# Patient Record
Sex: FEMALE | Race: BLACK OR AFRICAN AMERICAN | Employment: FULL TIME | URBAN - METROPOLITAN AREA
[De-identification: names, ages, dates, MRNs, and addresses within clinical notes are randomized per-mention and may not be internally consistent; named-entity substitution may affect disease eponyms.]

---

## 2017-08-22 ENCOUNTER — HOSPITAL ENCOUNTER (OUTPATIENT)
Dept: PHYSICAL THERAPY | Age: 47
Discharge: HOME | End: 2017-08-22
Payer: COMMERCIAL

## 2017-08-22 PROCEDURE — 97535 SELF CARE MNGMENT TRAINING: CPT

## 2017-08-22 PROCEDURE — 97162 PT EVAL MOD COMPLEX 30 MIN: CPT

## 2017-08-22 PROCEDURE — 97110 THERAPEUTIC EXERCISES: CPT

## 2017-08-22 NOTE — PROGRESS NOTES
PT DAILY TREATMENT NOTE/LUMBAR EVAL 3-16    Patient Name: Helder Hughes  Date:2017  : 1970  [x]  Patient  Verified  Payor: [employer] / Plan: Jazzy Freeman [id number] PPO / Product Type: PPO /    In time:9:40am  Out time:10:35am  Total Treatment Time (min): 55  Total Timed Codes (min): 25  1:1 Treatment Time ( only): 55   Visit #: 1 of 8    Treatment Area: Low back pain [M54.5]  Leg length discrepancy [M21.70]  SUBJECTIVE  Pain Level (0-10 scale): 6-7  [x]constant []intermittent []improving []worsening []no change since onset variable    Any medication changes, allergies to medications, adverse drug reactions, diagnosis change, or new procedure performed?: [x] No    [] Yes (see summary sheet for update)  Subjective functional status/changes:     Pt reports joint pain including back and hip pain for > 20 years, but reports it worsened 6-9 months ago without mechanism. She reports she has been screened for RA and lupus with no (+) results as of yet. X-rays unremarkable of ankles, knees, hips, back, shoulders per pt report with reports of \"arthritis\". Reports back pain increases with all activity including positioning in standing, sitting, lying for > 5-10 minutes and tends to be skewed to R side with referral into the R post thigh. Limited ease of negotiating stairs.     PLOF: limited activity tolerance intermittently for years d/t pain  Limitations to PLOF: worsening positional tolerance  Mechanism of Injury: chronic  Current symptoms/Complaints: hand pain and swelling/stiffness, L ankle swelling, LBP and R hip pain and aching into R post mid thigh, numbness in B hands/fingers starting a few months ago, reports leg length discrepancy, \"burning\" sensation in lateral leg occasionally  Previous Treatment/Compliance: no PT per pt report for this issue specifically, pain medication, otherwise none per pt report  PMHx/Surgical hx: unremarkable  Work Hx: teacher  Living Situation:   Pt Goals: \"Be able to sleep without pain and control pain without it being debilitating. \"  Barriers: []pain []financial []time []transportation []other  Motivation:   Substance use: []Alcohol []Tobacco []other:   FABQ Score: []low []elevate  Cognition: A & O x 4    Other:    OBJECTIVE/EXAMINATION    30 min [x]Eval                  []Re-Eval       16 min Therapeutic Exercise:  [] See flow sheet : HEP creation and instruction per handout   Rationale: increase ROM and increase strength to improve the patients ability to improve ease of ADLs. Self Care: 9 minutes pt education regarding relevant anatomy, diagnosis, prognosis, plan of care, education regarding proper footwear for midfoot arch support to facilitate pt compliance and self management. With   [] TE   [] TA   [] neuro   [] other: Patient Education: [x] Review HEP    [] Progressed/Changed HEP based on:   [] positioning   [] body mechanics   [] transfers   [] heat/ice application    [] other:      Other Objective/Functional Measures:     Physical Therapy Evaluation - Lumbar Spine ([facility])    SUBJECTIVE  Chief Complaint: back pain with referral into R buttock    Mechanism of injury: chronic    Symptoms:  Aggravated by:   [x] Bending [x] Sitting [x] Standing [x] Walking   [] Moving [] Cough [] Sneeze [] Valsalva   [] AM  [] PM  Lying:  [x] sup   [x] pro   [] sidelying   [] Other:     Eased by:    [] Bending [] Sitting [] Standing [] Walking   [] Moving [] AM  [] PM  Lying: [] sup  [] pro  [] sidelying   [] Other:     General Health:  Red Flags Indicated? [] Yes    [x] No  [] Yes [] No Recent weight change (If yes, due to dieting?  [] Yes  [] No)   [] Yes [] No Weakness in legs during walking  [] Yes [] No Unremitting pain at night  [] Yes [] No Abdominal pain or problems  [] Yes [] No Rectal bleeding  [] Yes [] No Feet more cold or painful in cold weather  [] Yes [] No Menstrual irregularities  [] Yes [] No Blood or pain with urination  [] Yes [] No Dysfunction of bowel or bladder  [] Yes [] No Recent illness within past 3 weeks (i.e, cold, flu)  [] Yes [] No Numbness/tingling in buttock/genitalia region    Past History/Treatments:     Diagnostic Tests: [] Lab work [x] X-rays    [] CT [] MRI     [] Other:  Results: \"arthritis\" per pt report, results not available in system    Functional Status  Prior level of function:  Present functional limitations:  What position do you sleep in?:    OBJECTIVE  Posture:  Lateral Shift: [] R    [] L     [] +  [x] -  Kyphosis: [] Increased [] Decreased   [x]  WNL  Lordosis:  [x] Increased [] Decreased   [] WNL  Pelvic symmetry: [x] WNL    [] Other:    Gait:  [x] Normal     [] Abnormal:    Active Movements: [] N/A   [] Too acute   [] Other:  ROM % AROM % PROM Comments:pain, area   Forward flexion 40-60 Fingers 7 inches from floor  B LBP and tightness into superior buttock   Extension 20-30 75%  Increased B \"sharp\" LBP   SB right 20-30 50% 100% L \"pulling\" LBP   SB left 20-30 50% 100% R \"pulling\" LBP   Rotation right 5-10 50% 100% \"pulling\" LBP with end range PROM and overpressure   Rotation left 5-10 50% 100% Pulling LBP with end range PROM and overpressure     Repeated Movements   Effects on present pain: produces (DE), abolishes (A), increases (incr), decreases (decr), centralizes (C), peripheral (PH), no effect (NE)   Pre-Test Sx Flexion Repeated Flexion Extension Repeated Extension Repeated SBL Repeated SBR   Sitting          Standing  inc inc inc inc     Lying  NE NE inc inc N/A N/A   Comments:  Side Glide:  Sustained passive positioning test:    Neuro Screen [x] WNL  Myotome/Dermatome/Reflexes:  Comments:    Dural Mobility:  SLR Sitting: [] R    [] L    [] +    [] -  @ (degrees):           Supine: [] R    [] L    [] +    [x] -  @ (degrees):   Slump Test: [] R    [] L    [] +    [x] -  @ (degrees):   Prone Knee Bend: [] R    [] L    [] +    [x] -      Palpation  [x] Min  [] Mod  [] Severe    Location: generalized TTP throughout B gluteals  [x] Min  [] Mod  [] Severe    Location: pain provocation R > L reported with sacral  and with palpation of deep lumbar paraspinals  [] Min  [] Mod  [] Severe    Location:    Strength   L(0-5) R (0-5) N/T   Hip Flexion (L1,2) 5 5 []   Knee Extension (L3,4) 5 5 []   Ankle Dorsiflexion (L4) 5 5 []   Great Toe Extension (L5) 5 5 []   Ankle Plantarflexion (S1) 5 5 []   Knee Flexion (S1,2) 5 5 []   Upper Abdominals   []   Lower Abdominals   []   Paraspinals   []   Back Rotators   []   Gluteus Josué 4 4 []   Other glute med 4 4 []   Hip ER/IR: 5/5 bilat    Special Tests  Lumbar:  Lumb.  Compression: [] Pos  [x] Neg               Lumbar Distraction:   [] Pos  [x] Neg    Quadrant:  [x] Pos  [] Neg   [] Flex  [x] Ext    Sacroilliac:  Gaenslen's: [] R    [] L    [] +    [x] -     Compression: [] +    [x] -     Gapping:  [] +    [x] -     Thigh Thrust: [] R    [] L    [] +    [] -     Leg Length: [] +    [x] -   Position: supine (-) , questionable R apparent leg length discrepancy in standing, but not clearly present    Crests: level    ASIS: level    PSIS:    Sacral Sulcus:    Mobility: Standing flex:     Sitting flex:     Supine to sit:     Prone knee bend:         Hip: Lukas Chol:  [x] R    [x] L    [x] +    [] - L> R mild limited hip mobility, B LBP provocation with R, R LBP provocation with L YONG and L hip discomfort    Scour:  [] R    [] L    [] +    [x] -     Piriformis: [] R    [] L    [] +    [x] -          Deficits: Manas's: [] R    [] L    [] +    [x] -     Miranda : [x] R    [x] L    [x] +    [] -     Hamstrings 90/90: (-) bilat    Gastrocsoleus (to neutral): Right: Left:    (+) Ely's bilat  Other tests/comments:  Actual leg length: R 87.5 cm, L 87.0 cm  No clear apparent leg length discrepancy    Poor lumbar trunk flexion mobility with limited reversal of lumbar lordosis to near neutral with active trunk flexion    Grossly WNL bilat hip ER/IR mobility in prone void of pain provocation    Reports relief with standing and supine PPT, but difficulty actively facilitating movement     SKC: WNL bilat  DKC: WNL  Bridge: B LBP reported and \"pulling tightness\"    Pain Level (0-10 scale) post treatment: 7    ASSESSMENT/Changes in Function: Per POC    Patient will continue to benefit from skilled PT services to modify and progress therapeutic interventions, address functional mobility deficits, address ROM deficits, address strength deficits, analyze and address soft tissue restrictions, analyze and cue movement patterns, analyze and modify body mechanics/ergonomics, assess and modify postural abnormalities and instruct in home and community integration to attain remaining goals. [x]  See Plan of Care  []  See progress note/recertification  []  See Discharge Summary         Progress towards goals / Updated goals:  Per POC. PLAN  [x]  Upgrade activities as tolerated     [x]  Continue plan of care  []  Update interventions per flow sheet       []  Discharge due to:_  [x]  Other:_Address limited ant hip mobility, lumbar extensor limited mobility, and reduce hyperlordotic postural tendencies.  Check pelvic alignment and leg length PRN, however, does not appear grossly abnormal during eval.      Hannah Martinez, PT, DPT, ATC, CSCS 8/22/2017  9:45 AM

## 2017-08-22 NOTE — PROGRESS NOTES
[facility]  [address]  [place], [address]  [phone] fax    Plan of Care/ Statement of Necessity for Physical Therapy Services    Patient name: Doreatha Hashimoto Start of Care: 2017   Referral source: Rigoberto Melchor MD : 1970    Medical Diagnosis: Low back pain [M54.5]   Onset Date:20+ years, exacerbation over last 6-9 months    Treatment Diagnosis: Mechanical LBP   Prior Hospitalization: see medical history Provider#: [id number]   Medications: Verified on Patient summary List    Comorbidities: \"arthritis\", otherwise unremarkable per pt report   Prior Level of Function: limited activity tolerance intermittently for years d/t pain     The Plan of Care and following information is based on the information from the initial evaluation. Assessment/ key information: Pt is a 55year old female who reports reports joint pain including back and hip pain for > 20 years, but reports it worsened 6-9 months ago without reported mechanism. She reports she has been screened for RA and lupus with no (+) results as of yet. X-rays unremarkable of ankles, knees, hips, back, shoulders per pt report with reports of \"arthritis\". Reports back pain increases with all activity including positioning in standing, sitting, lying for > 5-10 minutes and tends to be skewed to R side with referral into the R post thigh. Limited ease of negotiating stairs. She demonstrates signs and symptoms of mechanical LBP with possible SIJ involvement as evidenced by pain provocation and limited trunk mobility particularly with trunk extension > flexion, hyperlordotic lumbar posture, TTP B L/S paraspinals and pain with SIJ CPA assessment, TTP B gluteals, hypertonic lumbar paraspinals and limited ant hip extension mobility bilat, and (+) YONG L > R with some reproduction of back pain. At this time, pt demonstrate no significant pelvic malignment. obliquity and WNL true leg length. Pt will benefit from skilled PT to address her impairments and improve her level of function. Evaluation Complexity History MEDIUM  Complexity : 1-2 comorbidities / personal factors will impact the outcome/ POC ; Examination MEDIUM Complexity : 3 Standardized tests and measures addressing body structure, function, activity limitation and / or participation in recreation  ;Presentation MEDIUM Complexity : Evolving with changing characteristics  ; Clinical Decision Making MEDIUM Complexity : FOTO score of 26-74  Overall Complexity Rating: MEDIUM  Problem List: pain affecting function, decrease ROM, decrease ADL/ functional abilitiies, decrease activity tolerance and decrease flexibility/ joint mobility   Treatment Plan may include any combination of the following: Therapeutic exercise, Therapeutic activities, Neuromuscular re-education, Physical agent/modality, Manual therapy, Patient education and Self Care training  Patient / Family readiness to learn indicated by: asking questions and interest  Persons(s) to be included in education: patient (P)  Barriers to Learning/Limitations: Chronic duration of pain  Patient Goal (s): less pain during daily activities and to sleep with less pain.   Patient Self Reported Health Status: good  Rehabilitation Potential: fair    Short Term Goals: To be accomplished in 2 weeks:   1. Patient will report performance of HEP at least 2 times per day to facilitate improved outcomes and improved self management. 2. Pt will demonstrate ability to perform posterior pelvic tilt with reversal of lordotic lumbar curvature to reduce LBP and facilitate self management. Long Term Goals: To be accomplished in 4 weeks:   1. Patient will report FOTO score of 61 or better to indicate significant improvement in functional status. 2. Pt will demonstrate (-) wilda test bilat to reduce trunk extensor stress on her lumbar spine and improve ease of daily activity.    3. Pt will demonstrate trunk flexion fingers to toes to improve trunk mobility and ease of stooping activity. 4. Pt will report ability to sleep 4 hours during the night undisturbed by pain to improve QOL. Frequency / Duration: Patient to be seen 2 times per week for 4 weeks. Patient/ Caregiver education and instruction: Diagnosis, prognosis, self care, activity modification and exercises   [x]  Plan of care has been reviewed with PTA    Ellie Manrique, PT, DPT, ATC, CSCS 8/22/2017 2:18 PM    ________________________________________________________________________    I certify that the above Therapy Services are being furnished while the patient is under my care. I agree with the treatment plan and certify that this therapy is necessary.     [de-identified] Signature:____________________  Date:____________Time: _________    Please sign and return to [facility]  [address]  [place], [address]  [phone] fax

## 2017-08-25 ENCOUNTER — HOSPITAL ENCOUNTER (OUTPATIENT)
Dept: PHYSICAL THERAPY | Age: 47
Discharge: HOME | End: 2017-08-25
Payer: COMMERCIAL

## 2017-08-25 PROCEDURE — 97110 THERAPEUTIC EXERCISES: CPT

## 2017-08-25 PROCEDURE — 97112 NEUROMUSCULAR REEDUCATION: CPT

## 2017-08-27 PROCEDURE — 97112 NEUROMUSCULAR REEDUCATION: CPT

## 2017-08-27 PROCEDURE — 97110 THERAPEUTIC EXERCISES: CPT

## 2017-08-27 NOTE — PROGRESS NOTES
PT DAILY TREATMENT NOTE     Patient Name: Monica Don  Date:2017  : 1970  [x]  Patient  Verified  Payor: BLUE CROSS / Plan: Jazzy Freeman [id number] PPO / Product Type: PPO /    In time:5:31  Out time:6:22  Total Treatment Time (min): 41  Visit #: 2 of 8    Treatment Area: Low back pain [M54.5]    SUBJECTIVE  Pain Level (0-10 scale): 6  Any medication changes, allergies to medications, adverse drug reactions, diagnosis change, or new procedure performed?: [x] No    [] Yes (see summary sheet for update)  Subjective functional status/changes:   [] No changes reported  Pt reports some pain with hip flexor stretch off the bed at home.      OBJECTIVE    Modality rationale:    Min Type Additional Details    [] Estim:  []Unatt       []IFC  []Premod                        []Other:  []w/ice   []w/heat  Position:  Location:    [] Estim: []Att    []TENS instruct  []NMES                    []Other:  []w/US   []w/ice   []w/heat  Position:  Location:    []  Traction: [] Cervical       []Lumbar                       [] Prone          []Supine                       []Intermittent   []Continuous Lbs:  [] before manual  [] after manual    []  Ultrasound: []Continuous   [] Pulsed                           []1MHz   []3MHz W/cm2:  Location:    []  Iontophoresis with dexamethasone         Location: [] Take home patch   [] In clinic    []  Ice     []  heat  []  Ice massage  []  Laser   []  Anodyne Position:  Location:    []  Laser with stim  []  Other:  Position:  Location:    []  Vasopneumatic Device Pressure:       [] lo [] med [] hi   Temperature: [] lo [] med [] hi   [] Skin assessment post-treatment:  []intact []redness- no adverse reaction    []redness - adverse reaction:       31 min Therapeutic Exercise:  [x] See flow sheet :   Rationale: increase ROM and increase strength to improve the patients ability to perform ADL    10 min Neuromuscular Re-education:  []  See flow sheet :   Rationale: increase strength, improve coordination and increase proprioception  to improve the patients ability to perform functional tasks              With   [] TE   [] TA   [] neuro   [] other: Patient Education: [x] Review HEP    [] Progressed/Changed HEP based on:   [] positioning   [] body mechanics   [] transfers   [] heat/ice application    [] other:      Other Objective/Functional Measures: initiated therex per flow sheet     Pain Level (0-10 scale) post treatment: 6    ASSESSMENT/Changes in Function: pt challenged with therex but seemed to tolerate well. Adjusted supine Amilcar stretch off the end of the bed vs side for pt comfort. Patient will continue to benefit from skilled PT services to modify and progress therapeutic interventions, address functional mobility deficits, address ROM deficits, address strength deficits, analyze and address soft tissue restrictions, analyze and cue movement patterns and assess and modify postural abnormalities to attain remaining goals. []  See Plan of Care  []  See progress note/recertification  []  See Discharge Summary         Progress towards goals / Updated goals:  Short Term Goals: To be accomplished in 2 weeks:                         1. Patient will report performance of HEP at least 2 times per day to facilitate improved outcomes and improved self management. -progressing 8-25-17                         2. Pt will demonstrate ability to perform posterior pelvic tilt with reversal of lordotic lumbar curvature to reduce LBP and facilitate self management. Long Term Goals: To be accomplished in 4 weeks:                         1. Patient will report FOTO score of 61 or better to indicate significant improvement in functional status. 2. Pt will demonstrate (-) amilcar test bilat to reduce trunk extensor stress on her lumbar spine and improve ease of daily activity.                          3. Pt will demonstrate trunk flexion fingers to toes to improve trunk mobility and ease of stooping activity. 4. Pt will report ability to sleep 4 hours during the night undisturbed by pain to improve QOL.     PLAN  []  Upgrade activities as tolerated     [x]  Continue plan of care  []  Update interventions per flow sheet       []  Discharge due to:_  []  Other:_      Tasneem Branham, PT 8/27/2017  12:03 PM    Future Appointments  Date Time Provider Kim Alexandre   8/29/2017 6:00 PM [address] [place] [address]   9/1/2017 6:00 PM Tasneem Branham, PT [facility]

## 2017-08-29 ENCOUNTER — HOSPITAL ENCOUNTER (OUTPATIENT)
Dept: PHYSICAL THERAPY | Age: 47
Discharge: HOME | End: 2017-08-29
Payer: COMMERCIAL

## 2017-08-29 PROCEDURE — 97110 THERAPEUTIC EXERCISES: CPT

## 2017-08-29 PROCEDURE — 97112 NEUROMUSCULAR REEDUCATION: CPT

## 2017-08-29 NOTE — PROGRESS NOTES
PT DAILY TREATMENT NOTE     Patient Name: Cheryl Weeks  Date:2017  : 1970  [x]  Patient  Verified  Payor: BLUE CROSS / Plan: [facility] PPO / Product Type: PPO /    In time:6:00pm  Out time:6:48pm  Total Treatment Time (min): 48  Visit #: 3 of 8    Treatment Area: Low back pain [M54.5]    SUBJECTIVE  Pain Level (0-10 scale): 7  Any medication changes, allergies to medications, adverse drug reactions, diagnosis change, or new procedure performed?: [x] No    [] Yes (see summary sheet for update)  Subjective functional status/changes:   [] No changes reported  \"Its hurting in my hips and knees a lot today. I think its from the weather and all the driving. \" Pt reports hip discomfort with hip flexor stretching. \"    OBJECTIVE    24 min Therapeutic Exercise:  [x] See flow sheet :   Rationale: increase ROM and increase strength to improve the patients ability to perform ADLs. 24 min Neuromuscular Re-education:  [x]  See flow sheet :   Rationale: increase strength, improve coordination and increase proprioception  to improve the patients ability to perform functional tasks. With   [] TE   [] TA   [] neuro   [] other: Patient Education: [x] Review HEP    [] Progressed/Changed HEP based on:   [] positioning   [] body mechanics   [] transfers   [] heat/ice application    [] other:      Other Objective/Functional Measures:    Pelvic alignment: Level ASIS and iliac crests, (-) leg length discrepancy in supine and long sit    Cueing for ant abdominal activation during supine series on reformer    Occasionally slow intervention performance with some reports of LE fatigue    Fair trunk stability with cueing for initial positioning during 1/2 kneel extension     Pain Level (0-10 scale) post treatment: 4-5    ASSESSMENT/Changes in Function: Pt reports continued widespread joint pain.  She demonstrates ant abdominal weakness and some instability, but responds well to repeated cueing with some improvement noted. She reports decrease in pain post treatment. Educated pt regarding arthritis and positive effects of low impact exercise. Patient will continue to benefit from skilled PT services to modify and progress therapeutic interventions, address functional mobility deficits, address ROM deficits, address strength deficits, analyze and address soft tissue restrictions, analyze and cue movement patterns and assess and modify postural abnormalities to attain remaining goals. []  See Plan of Care  []  See progress note/recertification  []  See Discharge Summary         Progress towards goals / Updated goals:  Short Term Goals: To be accomplished in 2 weeks:                         7. Patient will report performance of HEP at least 2 times per day to facilitate improved outcomes and improved self management. -progressing 8-25-17.                          3. Pt will demonstrate ability to perform posterior pelvic tilt with reversal of lordotic lumbar curvature to reduce LBP and facilitate self management. Fair performance, progressing 8/29/2017  Long Term Goals: To be accomplished in 4 weeks:                         1. Patient will report FOTO score of 61 or better to indicate significant improvement in functional status.                        6. Pt will demonstrate (-) wilda test bilat to reduce trunk extensor stress on her lumbar spine and improve ease of daily activity.                        5. Pt will demonstrate trunk flexion fingers to toes to improve trunk mobility and ease of stooping activity.                        6. Pt will report ability to sleep 4 hours during the night undisturbed by pain to improve QOL.     PLAN  []  Upgrade activities as tolerated     [x]  Continue plan of care  []  Update interventions per flow sheet       []  Discharge due to:_  []  Other:_      Kiki Hooper, PT, DPT, ATC, CSCS 8/29/2017  6:07 PM    Future Appointments  Date Time Provider Kim Alexandre   9/1/2017 6:00 PM Jaleesa Delcid, PT [id number]

## 2017-09-01 ENCOUNTER — APPOINTMENT (OUTPATIENT)
Dept: PHYSICAL THERAPY | Age: 47
End: 2017-09-01
Payer: COMMERCIAL

## 2017-09-12 ENCOUNTER — APPOINTMENT (OUTPATIENT)
Dept: PHYSICAL THERAPY | Age: 47
End: 2017-09-12
Payer: COMMERCIAL

## 2017-09-14 ENCOUNTER — HOSPITAL ENCOUNTER (OUTPATIENT)
Dept: PHYSICAL THERAPY | Age: 47
Discharge: HOME | End: 2017-09-14
Payer: COMMERCIAL

## 2017-09-14 PROCEDURE — 97110 THERAPEUTIC EXERCISES: CPT

## 2017-09-14 PROCEDURE — 97112 NEUROMUSCULAR REEDUCATION: CPT

## 2017-09-14 NOTE — PROGRESS NOTES
PT DAILY TREATMENT NOTE     Patient Name: Charles Taylor  Date:2017  : 1970  [x]  Patient  Verified  Payor: BLUE CROSS / Plan: Jazzy Freeman [id number] PPO / Product Type: PPO /    In time:6:00  Out time:6:49  Total Treatment Time (min): 49 (24 mins 1:1)  Visit #: 4 of 8    Treatment Area: Low back pain [M54.5]    SUBJECTIVE  Pain Level (0-10 scale): 7/10  Any medication changes, allergies to medications, adverse drug reactions, diagnosis change, or new procedure performed?: [x] No    [] Yes (see summary sheet for update)  Subjective functional status/changes:   [] No changes reported  The patient states that her back is hurting today across and the lower part of her back. OBJECTIVE  39 min Therapeutic Exercise:  [x] See flow sheet :   Rationale: increase ROM and increase strength to improve the patients ability to improve ADL ease. 10 min Neuromuscular Re-education:  [x]  See flow sheet :   Rationale: increase ROM and increase strength  to improve the patients ability to improve ADL ease. With   [] TE   [] TA   [] neuro   [] other: Patient Education: [x] Review HEP    [] Progressed/Changed HEP based on:   [] positioning   [] body mechanics   [] transfers   [] heat/ice application    [] other:      Other Objective/Functional Measures:   Amilcar + bilaterally  Pt demonstrates fingertips to toes     Pain Level (0-10 scale) post treatment: 6/10    ASSESSMENT/Changes in Function: The patient continues to demonstrate lordotic tendency with limitations hip hip flexor and erector spinae flexibility. She does appear to have lumbar scoliotic tendency upon palpation, with further evidence per 2013 radiograph in history as to possible explanation of negative leg length discrepancy, negative SIJ involvement, but probably proximal involvement.     Patient will continue to benefit from skilled PT services to modify and progress therapeutic interventions, address functional mobility deficits, address ROM deficits, address strength deficits, analyze and address soft tissue restrictions, analyze and cue movement patterns, analyze and modify body mechanics/ergonomics, assess and modify postural abnormalities and instruct in home and community integration to attain remaining goals. []  See Plan of Care  []  See progress note/recertification  []  See Discharge Summary         Progress towards goals / Updated goals:  Short Term Goals: To be accomplished in 2 weeks:                         3. Patient will report performance of HEP at least 2 times per day to facilitate improved outcomes and improved self management. -progressing 8-25-17.                          9. Pt will demonstrate ability to perform posterior pelvic tilt with reversal of lordotic lumbar curvature to reduce LBP and facilitate self management. Fair performance, progressing 8/29/2017  Long Term Goals: To be accomplished in 4 weeks:                         1. Patient will report FOTO score of 61 or better to indicate significant improvement in functional status.                        5. Pt will demonstrate (-) wilda test bilat to reduce trunk extensor stress on her lumbar spine and improve ease of daily activity. Not met - + bilaterally 9/14/2017.                         3. Pt will demonstrate trunk flexion fingers to toes to improve trunk mobility and ease of stooping activity. Met 9/14/2017                          4. Pt will report ability to sleep 4 hours during the night undisturbed by pain to improve QOL.     PLAN  []  Upgrade activities as tolerated     [x]  Continue plan of care  []  Update interventions per flow sheet       []  Discharge due to:_  []  Other:_      Daphney Gomez PT 9/14/2017  7:16 PM    Future Appointments  Date Time Provider Kim Alexandre   9/20/2017 6:00 PM [facility]   9/22/2017 5:30 PM [facility]

## 2017-09-20 ENCOUNTER — HOSPITAL ENCOUNTER (OUTPATIENT)
Dept: PHYSICAL THERAPY | Age: 47
Discharge: HOME | End: 2017-09-20
Payer: COMMERCIAL

## 2017-09-20 PROCEDURE — 97110 THERAPEUTIC EXERCISES: CPT

## 2017-09-20 PROCEDURE — 97112 NEUROMUSCULAR REEDUCATION: CPT

## 2017-09-20 PROCEDURE — 97014 ELECTRIC STIMULATION THERAPY: CPT

## 2017-09-20 NOTE — PROGRESS NOTES
PT DAILY TREATMENT NOTE     Patient Name: Ankit Christian  Date:2017  : 1970  [x]  Patient  Verified  Payor: BLUE CROSS / Plan: Jazzy Freeman [id number] PPO / Product Type: PPO /    In time:6:00  Out time:6:58  Total Treatment Time (min): 58 (58 mins 1:1)  Visit #: 5 of 8    Treatment Area: Low back pain [M54.5]    SUBJECTIVE  Pain Level (0-10 scale): 6/10  Any medication changes, allergies to medications, adverse drug reactions, diagnosis change, or new procedure performed?: [x] No    [] Yes (see summary sheet for update)  Subjective functional status/changes:   [] No changes reported  Pt reports mostly having pain on the L side today.     OBJECTIVE  Modality rationale: decrease pain and increase tissue extensibility to improve the patients ability to perform daily tasks   Min Type Additional Details   10 [x] Estim:  [x]Unatt       []IFC  []Premod                        [x]Other: HV [x]w/ice   []w/heat  Position: prone  Location: L QL    [] Estim: []Att    []TENS instruct  []NMES                    []Other:  []w/US   []w/ice   []w/heat  Position:  Location:    []  Traction: [] Cervical       []Lumbar                       [] Prone          []Supine                       []Intermittent   []Continuous Lbs:  [] before manual  [] after manual    []  Ultrasound: []Continuous   [] Pulsed                           []1MHz   []3MHz Location:  W/cm2:    []  Iontophoresis with dexamethasone         Location: [] Take home patch   [] In clinic    []  Ice     []  heat  []  Ice massage  []  Laser   []  Anodyne Position:  Location:    []  Laser with stim  []  Other: Position:  Location:    []  Vasopneumatic Device Pressure:       [] lo [] med [] hi   Temperature: [] lo [] med [] hi   [] Skin assessment post-treatment:  []intact []redness- no adverse reaction    []redness - adverse reaction:     38 min Therapeutic Exercise:  [x] See flow sheet :   Rationale: increase ROM and increase strength to improve the patients ability to improve ADL ease. 10 min Neuromuscular Re-education:  [x]  See flow sheet :   Rationale: increase ROM and increase strength  to improve the patients ability to improve ADL ease. With   [] TE   [] TA   [] neuro   [] other: Patient Education: [x] Review HEP    [] Progressed/Changed HEP based on:   [] positioning   [] body mechanics   [] transfers   [] heat/ice application    [] other:      Other Objective/Functional Measures: Added HV trial for L QL restriction and pain  Pain Level (0-10 scale) post treatment: 5/10    ASSESSMENT/Changes in Function:  Slight decrease in pain following modalities. Patient will continue to benefit from skilled PT services to modify and progress therapeutic interventions, address functional mobility deficits, address ROM deficits, address strength deficits, analyze and address soft tissue restrictions, analyze and cue movement patterns, analyze and modify body mechanics/ergonomics, assess and modify postural abnormalities and instruct in home and community integration to attain remaining goals. []  See Plan of Care  []  See progress note/recertification  []  See Discharge Summary         Progress towards goals / Updated goals:  Short Term Goals: To be accomplished in 2 weeks:                         5. Patient will report performance of HEP at least 2 times per day to facilitate improved outcomes and improved self management. -progressing 8-25-17.                          0. Pt will demonstrate ability to perform posterior pelvic tilt with reversal of lordotic lumbar curvature to reduce LBP and facilitate self management. Fair performance, progressing 8/29/2017  Long Term Goals: To be accomplished in 4 weeks:                         1. Patient will report FOTO score of 61 or better to indicate significant improvement in functional status.                          4. Pt will demonstrate (-) wilda test bilat to reduce trunk extensor stress on her lumbar spine and improve ease of daily activity. Not met - + bilaterally 9/14/2017.                         3. Pt will demonstrate trunk flexion fingers to toes to improve trunk mobility and ease of stooping activity. Met 9/14/2017                          4. Pt will report ability to sleep 4 hours during the night undisturbed by pain to improve QOL.     PLAN  []  Upgrade activities as tolerated     [x]  Continue plan of care  []  Update interventions per flow sheet       []  Discharge due to:_  []  Other:_      Nancie Alexander PT 9/20/2017  7:16 PM    Future Appointments  Date Time Provider Kim Alexandre   9/22/2017 5:30 PM Nancie Alexander PT [id number]

## 2017-09-22 ENCOUNTER — HOSPITAL ENCOUNTER (OUTPATIENT)
Dept: PHYSICAL THERAPY | Age: 47
Discharge: HOME | End: 2017-09-22
Payer: COMMERCIAL

## 2017-09-22 PROCEDURE — 97110 THERAPEUTIC EXERCISES: CPT

## 2017-09-22 PROCEDURE — 97014 ELECTRIC STIMULATION THERAPY: CPT

## 2017-09-22 PROCEDURE — 97112 NEUROMUSCULAR REEDUCATION: CPT

## 2017-09-22 NOTE — PROGRESS NOTES
PT DAILY TREATMENT NOTE     Patient Name: Henry Evans  Date:2017  : 1970  [x]  Patient  Verified  Payor: BLUE CROSS / Plan: Jazzy Freeman [id number] PPO / Product Type: PPO /    In time:5:40  Out time: 6:25  Total Treatment Time (min):45  (25mins 1:1)  Visit #: 6 of 8    Treatment Area: Low back pain [M54.5]    SUBJECTIVE  Pain Level (0-10 scale): -8/10  Any medication changes, allergies to medications, adverse drug reactions, diagnosis change, or new procedure performed?: [x] No    [] Yes (see summary sheet for update)  Subjective functional status/changes:   [] No changes reported  Pt reports she felt better after last session but has had increased pain today.      OBJECTIVE  Modality rationale: decrease pain and increase tissue extensibility to improve the patients ability to perform daily tasks   Min Type Additional Details   10 [x] Estim:  [x]Unatt       []IFC  []Premod                        [x]Other: HV []w/ice   [x]w/heat  Position: prone  Location: L QL    [] Estim: []Att    []TENS instruct  []NMES                    []Other:  []w/US   []w/ice   []w/heat  Position:  Location:    []  Traction: [] Cervical       []Lumbar                       [] Prone          []Supine                       []Intermittent   []Continuous Lbs:  [] before manual  [] after manual    []  Ultrasound: []Continuous   [] Pulsed                           []1MHz   []3MHz Location:  W/cm2:    []  Iontophoresis with dexamethasone         Location: [] Take home patch   [] In clinic    []  Ice     []  heat  []  Ice massage  []  Laser   []  Anodyne Position:  Location:    []  Laser with stim  []  Other: Position:  Location:    []  Vasopneumatic Device Pressure:       [] lo [] med [] hi   Temperature: [] lo [] med [] hi   [] Skin assessment post-treatment:  []intact []redness- no adverse reaction    []redness - adverse reaction:     25 min Therapeutic Exercise:  [x] See flow sheet :   Rationale: increase ROM and increase strength to improve the patients ability to improve ADL ease. 10 min Neuromuscular Re-education:  [x]  See flow sheet :   Rationale: increase ROM and increase strength  to improve the patients ability to improve ADL ease. With   [] TE   [] TA   [] neuro   [] other: Patient Education: [x] Review HEP    [] Progressed/Changed HEP based on:   [] positioning   [] body mechanics   [] transfers   [] heat/ice application    [] other:      Other Objective/Functional Measures:  Held therex per flow sheet secondary to TC  Pain Level (0-10 scale) post treatment: 5/10    ASSESSMENT/Changes in Function:  Diminished pain following treatment. Pt with slow progress. Hip flexors remain limited in flexibility. Patient will continue to benefit from skilled PT services to modify and progress therapeutic interventions, address functional mobility deficits, address ROM deficits, address strength deficits, analyze and address soft tissue restrictions, analyze and cue movement patterns, analyze and modify body mechanics/ergonomics, assess and modify postural abnormalities and instruct in home and community integration to attain remaining goals. []  See Plan of Care  []  See progress note/recertification  []  See Discharge Summary         Progress towards goals / Updated goals:  Short Term Goals: To be accomplished in 2 weeks:                         1. Patient will report performance of HEP at least 2 times per day to facilitate improved outcomes and improved self management. -progressing 8-25-17.                          6. Pt will demonstrate ability to perform posterior pelvic tilt with reversal of lordotic lumbar curvature to reduce LBP and facilitate self management. Fair performance, progressing 8/29/2017  Long Term Goals: To be accomplished in 4 weeks:                         1.  Patient will report FOTO score of 61 or better to indicate significant improvement in functional status.                        9. Pt will demonstrate (-) wilda test bilat to reduce trunk extensor stress on her lumbar spine and improve ease of daily activity. Not met - + bilaterally 9/14/2017.                         3. Pt will demonstrate trunk flexion fingers to toes to improve trunk mobility and ease of stooping activity. Met 9/14/2017                          4. Pt will report ability to sleep 4 hours during the night undisturbed by pain to improve QOL.     PLAN  []  Upgrade activities as tolerated     [x]  Continue plan of care  []  Update interventions per flow sheet       []  Discharge due to:_  []  Other:_      Nancie Alexander, PT 9/22/2017  6:16 PM    Future Appointments  Date Time Provider Kim Alexandre   9/26/2017 6:00 PM [address] [place] [address] [facility]

## 2017-09-26 ENCOUNTER — HOSPITAL ENCOUNTER (OUTPATIENT)
Dept: PHYSICAL THERAPY | Age: 47
Discharge: HOME | End: 2017-09-26
Payer: COMMERCIAL

## 2017-09-26 PROCEDURE — 97014 ELECTRIC STIMULATION THERAPY: CPT

## 2017-09-26 PROCEDURE — 97110 THERAPEUTIC EXERCISES: CPT

## 2017-09-26 PROCEDURE — 97112 NEUROMUSCULAR REEDUCATION: CPT

## 2017-09-26 PROCEDURE — 97140 MANUAL THERAPY 1/> REGIONS: CPT

## 2017-09-26 NOTE — PROGRESS NOTES
[facility]  [address] [place] [address], [facility] [address]  [phone] fax    Physical Therapy Progress Note  Patient name: Lilian Doe Start of Care: 2017   Referral source: Narciso Melchor MD : 1970   Medical/Treatment Diagnosis: Low back pain [M54.5] Onset Date:20+ years, exacerbation over last 6-9 months                          Prior Hospitalization: see medical history Provider#: [id number]   Medications: Verified on Patient Summary List     Comorbidities: \"arthritis\", otherwise unremarkable per pt report   Prior Level of Function: limited activity tolerance intermittently for years d/t pain  Visits from Start of Care: 7    Missed Visits: 0      Established Goals:        Excellent         Good         Limited            None  [] Increased ROM   []  []  [x]  []  [] Increased Strength  []  []  []  []  [] Increased Mobility  []  []  [x]  []   [] Decreased Pain   []  []  [x]  []  [] Decreased Swelling  []  []  []  []    Key Functional Changes: Pt demonstrates some improved trunk flexion but remains somewhat limited by hypertonic L/S paraspinals and tight hip flexors with associated hyperlordotic posture and glute inhibition, but demonstrates fair symptom response with limited carryover post interventions. She will benefit from brief continuance of PT to address her impairment and progress towards independent self management. Updated Goals: to be achieved in 3 weeks:  Long Term Goals: To be accomplished in 4 weeks:                         1. Patient will report FOTO score of 61 or better to indicate significant improvement in functional status. TC Will complete NV 2017                         2. Pt will demonstrate (-) wilda test bilat to reduce trunk extensor stress on her lumbar spine and improve ease of daily activity. Not met - + bilaterally 2017.  Remains (+) bilat 2017                         3. Pt will demonstrate trunk flexion fingers to toes to improve trunk mobility and ease of stooping activity. Met 9/14/2017                          4. Pt will report ability to sleep 4 hours during the night undisturbed by pain to improve QOL. Not met, pt reports continued LBP and sleep disturbance, particularly in supine 9/26/2017       ASSESSMENT/RECOMMENDATIONS:  [x]Continue therapy per initial plan/protocol at a frequency of  1-2 x per week for 2-3 weeks  []Continue therapy with the following recommended changes:_____________________      _____________________________________________________________________  []Discontinue therapy progressing towards or have reached established goals  []Discontinue therapy due to lack of appreciable progress towards goals  []Discontinue therapy due to lack of attendance or compliance  []Await Physician's recommendations/decisions regarding therapy  []Other:________________________________________________________________    Thank you for this referral.    Sarahi Hurley, PT, DPT, ATC, CSCS 9/26/2017 7:18 PM  NOTE TO PHYSICIAN:  PLEASE COMPLETE THE ORDERS BELOW AND   FAX TO [facility]: [phone]  If you are unable to process this request in 24 hours please contact our office: [phone]    ? I have read the above report and request that my patient continue as recommended. ? I have read the above report and request that my patient continue therapy with the following changes/special instructions:__________________________________________________________  ? I have read the above report and request that my patient be discharged from therapy.     Physicians signature: ______________________________Date: ______Time:______

## 2017-09-26 NOTE — PROGRESS NOTES
PT DAILY TREATMENT NOTE 12    Patient Name: Paris Velasquez  Date:2017  : 1970  [x]  Patient  Verified  Payor: BLUE CROSS / Plan: [facility] PPO / Product Type: PPO /    In time:6:04pm  Out time:7:01pm  Total Treatment Time (min): 62   1:1 time: 62  Visit #: 7 of 8    Treatment Area: Low back pain [M54.5]    SUBJECTIVE  Pain Level (0-10 scale): 6-7  Any medication changes, allergies to medications, adverse drug reactions, diagnosis change, or new procedure performed?: [x] No    [] Yes (see summary sheet for update)  Subjective functional status/changes:   [] No changes reported  Pt reports continu    OBJECTIVE  39 min Therapeutic Exercise:  [x] See flow sheet :   Rationale: increase ROM and increase strength to improve the patients ability to improve ease of daily activity. 8 min Neuromuscular Re-education:  [x]  See flow sheet :   Rationale: increase ROM, increase strength, improve coordination and increase proprioception  to improve the patients ability to improve ease of ADLs. Modality rationale: decrease pain and increase tissue extensibility to improve the patients ability to improve ease of trunk flexion mobility and decrease pain to improve QOL.    Min Type Additional Details   10 [x] Estim:  []Unatt       []IFC  []Premod                        [x]Other: HV  []w/ice   [x]w/heat  Position: prone with pillows under hips  Location: Bilat low back    [] Estim: []Att    []TENS instruct  []NMES                    []Other:  []w/US   []w/ice   []w/heat  Position:  Location:    []  Traction: [] Cervical       []Lumbar                       [] Prone          []Supine                       []Intermittent   []Continuous Lbs:  [] before manual  [] after manual    []  Ultrasound: []Continuous   [] Pulsed                           []1MHz   []3MHz Location:  W/cm2:    []  Iontophoresis with dexamethasone         Location: [] Take home patch   [] In clinic    [] Ice     []  heat  []  Ice massage  []  Laser   []  Anodyne Position:  Location:    []  Laser with stim  []  Other: Position:  Location:    []  Vasopneumatic Device Pressure:       [] lo [] med [] hi   Temperature: [] lo [] med [] hi   [] Skin assessment post-treatment:  []intact []redness- no adverse reaction    []redness - adverse reaction:          With   [] TE   [] TA   [] neuro   [] other: Patient Education: [x] Review HEP    [] Progressed/Changed HEP based on:   [] positioning   [] body mechanics   [] transfers   [] heat/ice application    [] other:      Other Objective/Functional Measures:     Trunk flexion fingers to toes, some reversal of lordosis but does not move much beyond neutral     Amilcar test: (+) bilat R > L    Pain Level (0-10 scale) post treatment: 4-5    ASSESSMENT/Changes in Function: Pt demonstrates some improved trunk flexion but remains somewhat limited by hypertonic L/S paraspinals and tight hip flexors with associated hyperlordotic posture and glute inhibition, but demonstrates fair symptom response with limited carryover post interventions. She will benefit from brief continuance of PT to address her impairment and progress towards independent self management. Patient will continue to benefit from skilled PT services to modify and progress therapeutic interventions, address functional mobility deficits, address ROM deficits, address strength deficits, analyze and address soft tissue restrictions, analyze and cue movement patterns, analyze and modify body mechanics/ergonomics, assess and modify postural abnormalities and instruct in home and community integration to attain remaining goals. []  See Plan of Care  []  See progress note/recertification  []  See Discharge Summary         Progress towards goals / Updated goals:  Short Term Goals: To be accomplished in 2 weeks:                         2.  Patient will report performance of HEP at least 2 times per day to facilitate improved outcomes and improved self management. -progressing 8-25-17.                          2. Pt will demonstrate ability to perform posterior pelvic tilt with reversal of lordotic lumbar curvature to reduce LBP and facilitate self management. Fair performance, progressing 8/29/2017  Long Term Goals: To be accomplished in 4 weeks:                         1. Patient will report FOTO score of 61 or better to indicate significant improvement in functional status. TC Will complete NV 9/26/2017                         2. Pt will demonstrate (-) wilda test bilat to reduce trunk extensor stress on her lumbar spine and improve ease of daily activity. Not met - + bilaterally 9/14/2017. Remains (+) bilat 9/26/2017                         3. Pt will demonstrate trunk flexion fingers to toes to improve trunk mobility and ease of stooping activity. Met 9/14/2017                          4. Pt will report ability to sleep 4 hours during the night undisturbed by pain to improve QOL.  Not met, pt reports continued LBP and sleep disturbance, particularly in supine 9/26/2017       PLAN  [x]  Upgrade activities as tolerated     [x]  Continue plan of care  []  Update interventions per flow sheet       []  Discharge due to:_  [x]  Other:_      Shila Irvin, PT, DPT, ATC, CSCS 9/26/2017  6:09 PM    Future Appointments  Date Time Provider Kim Alexandre   9/27/2017 6:00 PM [facility]

## 2017-09-27 ENCOUNTER — HOSPITAL ENCOUNTER (OUTPATIENT)
Dept: PHYSICAL THERAPY | Age: 47
Discharge: HOME | End: 2017-09-27
Payer: COMMERCIAL

## 2017-09-27 PROCEDURE — 97014 ELECTRIC STIMULATION THERAPY: CPT

## 2017-09-27 PROCEDURE — 97112 NEUROMUSCULAR REEDUCATION: CPT

## 2017-09-27 PROCEDURE — 97110 THERAPEUTIC EXERCISES: CPT

## 2017-09-27 NOTE — PROGRESS NOTES
PT DAILY TREATMENT NOTE     Patient Name: Mone Duron  Date:2017  : 1970  [x]  Patient  Verified  Payor: [employer] / Plan: [employer] [id number] PPO / Product Type: PPO /    In time:6:08  Out time: 7:00  Total Treatment Time (min): 46   1:1 time: 25  Visit #: 1 of 2-6    Treatment Area: Low back pain [M54.5]    SUBJECTIVE  Pain Level (0-10 scale): 5  Any medication changes, allergies to medications, adverse drug reactions, diagnosis change, or new procedure performed?: [x] No    [] Yes (see summary sheet for update)  Subjective functional status/changes:   [] No changes reported  Pt reports she feels like PT is helping but still has pain. Reports some R sided pain today. OBJECTIVE  34 min Therapeutic Exercise:  [x] See flow sheet :   Rationale: increase ROM and increase strength to improve the patients ability to improve ease of daily activity. 8 min Neuromuscular Re-education:  [x]  See flow sheet :   Rationale: increase ROM, increase strength, improve coordination and increase proprioception  to improve the patients ability to improve ease of ADLs. Modality rationale: decrease pain and increase tissue extensibility to improve the patients ability to improve ease of trunk flexion mobility and decrease pain to improve QOL.    Min Type Additional Details   10 [x] Estim:  []Unatt       []IFC  []Premod                        [x]Other: HV  []w/ice   [x]w/heat  Position: prone with pillows under hips  Location: Bilat low back    [] Estim: []Att    []TENS instruct  []NMES                    []Other:  []w/US   []w/ice   []w/heat  Position:  Location:    []  Traction: [] Cervical       []Lumbar                       [] Prone          []Supine                       []Intermittent   []Continuous Lbs:  [] before manual  [] after manual    []  Ultrasound: []Continuous   [] Pulsed                           []1MHz   []3MHz Location:  W/cm2:    []  Iontophoresis with dexamethasone         Location: [] Take home patch   [] In clinic    []  Ice     []  heat  []  Ice massage  []  Laser   []  Anodyne Position:  Location:    []  Laser with stim  []  Other: Position:  Location:    []  Vasopneumatic Device Pressure:       [] lo [] med [] hi   Temperature: [] lo [] med [] hi   [] Skin assessment post-treatment:  []intact []redness- no adverse reaction    []redness - adverse reaction:          With   [] TE   [] TA   [] neuro   [] other: Patient Education: [x] Review HEP    [] Progressed/Changed HEP based on:   [] positioning   [] body mechanics   [] transfers   [] heat/ice application    [] other:      Other Objective/Functional Measures:  Level alignment  Pain Level (0-10 scale) post treatment: 4    ASSESSMENT/Changes in Function:  Pt with slow progress with PT. glute inhibition present but does seem to be making some progress. Patient will continue to benefit from skilled PT services to modify and progress therapeutic interventions, address functional mobility deficits, address ROM deficits, address strength deficits, analyze and address soft tissue restrictions, analyze and cue movement patterns, analyze and modify body mechanics/ergonomics, assess and modify postural abnormalities and instruct in home and community integration to attain remaining goals. []  See Plan of Care  []  See progress note/recertification  []  See Discharge Summary         Progress towards goals / Updated goals:  Short Term Goals: To be accomplished in 2 weeks:                         3.  Patient will report performance of HEP at least 2 times per day to facilitate improved outcomes and improved self management. -progressing 8-25-17.                          2. Pt will demonstrate ability to perform posterior pelvic tilt with reversal of lordotic lumbar curvature to reduce LBP and facilitate self management. Fair performance, progressing 8/29/2017  Long Term Goals: To be accomplished in 4 weeks:                         3. Patient will report FOTO score of 61 or better to indicate significant improvement in functional status. TC Will complete NV 9/26/2017                         2. Pt will demonstrate (-) wilda test bilat to reduce trunk extensor stress on her lumbar spine and improve ease of daily activity. Not met - + bilaterally 9/14/2017. Remains (+) bilat 9/26/2017                         3. Pt will demonstrate trunk flexion fingers to toes to improve trunk mobility and ease of stooping activity. Met 9/14/2017                          4. Pt will report ability to sleep 4 hours during the night undisturbed by pain to improve QOL.  Not met, pt reports continued LBP and sleep disturbance, particularly in supine 9/26/2017       PLAN  [x]  Upgrade activities as tolerated     []  Continue plan of care  []  Update interventions per flow sheet       []  Discharge due to:_  []  Other:_      Neeru Kennedy PT  9/27/2017  6:29 PM    Future Appointments  Date Time Provider Kim Fredericki   10/3/2017 6:00 PM [address] [place] [address]   10/4/2017 6:00 PM Neeru Kennedy PT [facility]   10/10/2017 6:00 PM Loc Mullen [facility]

## 2017-10-03 ENCOUNTER — HOSPITAL ENCOUNTER (OUTPATIENT)
Dept: PHYSICAL THERAPY | Age: 47
Discharge: HOME | End: 2017-10-03
Payer: COMMERCIAL

## 2017-10-03 PROCEDURE — 97014 ELECTRIC STIMULATION THERAPY: CPT

## 2017-10-03 PROCEDURE — 97110 THERAPEUTIC EXERCISES: CPT

## 2017-10-03 PROCEDURE — 97112 NEUROMUSCULAR REEDUCATION: CPT

## 2017-10-03 NOTE — PROGRESS NOTES
PT DAILY TREATMENT NOTE     Patient Name: Morales Ponce  Date:10/3/2017  : 1970  [x]  Patient  Verified  Payor: [employer]UE [employer] / Plan: [facility] PPO / Product Type: PPO /    In time:6:01pm  Out time:6:52pm  Total Treatment Time (min): 51  Visit #: 2 of 2-6    Treatment Area: Low back pain [M54.5]    SUBJECTIVE  Pain Level (0-10 scale): 5  Any medication changes, allergies to medications, adverse drug reactions, diagnosis change, or new procedure performed?: [x] No    [] Yes (see summary sheet for update)  Subjective functional status/changes:   [] No changes reported  Pt reports R sided low back discomfort, but reports it is more \"tight and pulling than pain\". OBJECTIVE  31 min Therapeutic Exercise:  [x] See flow sheet :   Rationale: increase ROM and increase strength to improve the patients ability to improve ease of daily activity. 10 min Neuromuscular Re-education:  [x]  See flow sheet :   Rationale: increase ROM, increase strength, improve coordination and increase proprioception  to improve the patients ability to improve ease of ADLs. Modality rationale: decrease pain and increase tissue extensibility to improve the patients ability to improve ADL ease.    Min Type Additional Details   10 [x] Estim:  [x]Unatt       [x]IFC  []Premod                        []Other:  []w/ice   []w/heat  Position: prone with pillows under hips  Location: L/S    [] Estim: []Att    []TENS instruct  []NMES                    []Other:  []w/US   []w/ice   []w/heat  Position:  Location:    []  Traction: [] Cervical       []Lumbar                       [] Prone          []Supine                       []Intermittent   []Continuous Lbs:  [] before manual  [] after manual    []  Ultrasound: []Continuous   [] Pulsed                           []1MHz   []3MHz Location:  W/cm2:    []  Iontophoresis with dexamethasone         Location: [] Take home patch   [] In clinic    []  Ice     [] heat  []  Ice massage  []  Laser   []  Anodyne Position:  Location:    []  Laser with stim  []  Other: Position:  Location:    []  Vasopneumatic Device Pressure:       [] lo [] med [] hi   Temperature: [] lo [] med [] hi   [] Skin assessment post-treatment:  []intact [x]redness- no adverse reaction    []redness - adverse reaction:           With   [] TE   [] TA   [] neuro   [] other: Patient Education: [x] Review HEP    [] Progressed/Changed HEP based on:   [] positioning   [] body mechanics   [] transfers   [] heat/ice application    [] other:      Other Objective/Functional Measures:      Pain Level (0-10 scale) post treatment: 6    ASSESSMENT/Changes in Function: Pt remains limited by tight hip flexors and L/S extensors, but is gradually progressing with interventions. Patient will continue to benefit from skilled PT services to modify and progress therapeutic interventions, address functional mobility deficits, address ROM deficits, address strength deficits, analyze and address soft tissue restrictions, analyze and cue movement patterns, analyze and modify body mechanics/ergonomics, assess and modify postural abnormalities and instruct in home and community integration to attain remaining goals. []  See Plan of Care  []  See progress note/recertification  []  See Discharge Summary         Progress towards goals / Updated goals:  Short Term Goals: To be accomplished in 2 weeks:                         6. Patient will report performance of HEP at least 2 times per day to facilitate improved outcomes and improved self management. -progressing 8-25-17.                          9. Pt will demonstrate ability to perform posterior pelvic tilt with reversal of lordotic lumbar curvature to reduce LBP and facilitate self management. Fair performance, progressing 8/29/2017  Long Term Goals: To be accomplished in 4 weeks:                         1.  Patient will report FOTO score of 61 or better to indicate significant improvement in functional status. TC Will complete NV 9/26/2017                         2. Pt will demonstrate (-) wilda test bilat to reduce trunk extensor stress on her lumbar spine and improve ease of daily activity. Not met - + bilaterally 9/14/2017. Remains (+) bilat 9/26/2017                         3. Pt will demonstrate trunk flexion fingers to toes to improve trunk mobility and ease of stooping activity. Met 9/14/2017                          4. Pt will report ability to sleep 4 hours during the night undisturbed by pain to improve QOL.  Not met, pt reports continued LBP and sleep disturbance, particularly in supine 9/26/2017    PLAN  [x]  Upgrade activities as tolerated     [x]  Continue plan of care  []  Update interventions per flow sheet       []  Discharge due to:_  []  Other:_      Kristal Jordan, PT, DPT, ATC, CSCS 10/3/2017  6:16 PM    Future Appointments  Date Time Provider Kim Alexandre   10/4/2017 6:00 [facility]   10/10/2017 6:00 PM Kristal Jordan [facility]

## 2017-10-04 ENCOUNTER — HOSPITAL ENCOUNTER (OUTPATIENT)
Dept: PHYSICAL THERAPY | Age: 47
Discharge: HOME | End: 2017-10-04
Payer: COMMERCIAL

## 2017-10-04 PROCEDURE — 97014 ELECTRIC STIMULATION THERAPY: CPT

## 2017-10-04 PROCEDURE — 97112 NEUROMUSCULAR REEDUCATION: CPT

## 2017-10-04 NOTE — PROGRESS NOTES
PT DAILY TREATMENT NOTE     Patient Name: Yared Home  Date:10/4/2017  : 1970  [x]  Patient  Verified  Payor: [employer]UE [employer] / Plan: [facility] PPO / Product Type: PPO /    In time:6:07  Out time:7:02  Total Treatment Time (min):55    1:1 15  Visit #: 3 of 2-6    Treatment Area: Low back pain [M54.5]    SUBJECTIVE  Pain Level (0-10 scale): 6-7  Any medication changes, allergies to medications, adverse drug reactions, diagnosis change, or new procedure performed?: [x] No    [] Yes (see summary sheet for update)  Subjective functional status/changes:   [] No changes reported  Pt reports increased pain today, especially on the R.     OBJECTIVE  35 min Therapeutic Exercise:  [x] See flow sheet :   Rationale: increase ROM and increase strength to improve the patients ability to improve ease of daily activity. 10 min Neuromuscular Re-education:  [x]  See flow sheet :   Rationale: increase ROM, increase strength, improve coordination and increase proprioception  to improve the patients ability to improve ease of ADLs. Modality rationale: decrease pain and increase tissue extensibility to improve the patients ability to improve ADL ease.    Min Type Additional Details   10 [x] Estim:  [x]Unatt       [x]IFC  []Premod                        []Other:  []w/ice   []w/heat  Position: prone with pillows under hips  Location: L/S    [] Estim: []Att    []TENS instruct  []NMES                    []Other:  []w/US   []w/ice   []w/heat  Position:  Location:    []  Traction: [] Cervical       []Lumbar                       [] Prone          []Supine                       []Intermittent   []Continuous Lbs:  [] before manual  [] after manual    []  Ultrasound: []Continuous   [] Pulsed                           []1MHz   []3MHz Location:  W/cm2:    []  Iontophoresis with dexamethasone         Location: [] Take home patch   [] In clinic    []  Ice     []  heat  []  Ice massage  [] Laser   []  Anodyne Position:  Location:    []  Laser with stim  []  Other: Position:  Location:    []  Vasopneumatic Device Pressure:       [] lo [] med [] hi   Temperature: [] lo [] med [] hi   [] Skin assessment post-treatment:  []intact [x]redness- no adverse reaction    []redness - adverse reaction:           With   [] TE   [] TA   [] neuro   [] other: Patient Education: [x] Review HEP    [] Progressed/Changed HEP based on:   [] positioning   [] body mechanics   [] transfers   [] heat/ice application    [] other:      Other Objective/Functional Measures: held therex per flow sheet due to TC and pt being late. Pain Level (0-10 scale) post treatment: 6-7    ASSESSMENT/Changes in Function: Pt with increased pain today as compared to last session. She demonstrates slow overall progress with PT. Patient will continue to benefit from skilled PT services to modify and progress therapeutic interventions, address functional mobility deficits, address ROM deficits, address strength deficits, analyze and address soft tissue restrictions, analyze and cue movement patterns, analyze and modify body mechanics/ergonomics, assess and modify postural abnormalities and instruct in home and community integration to attain remaining goals. []  See Plan of Care  []  See progress note/recertification  []  See Discharge Summary         Progress towards goals / Updated goals:  Short Term Goals: To be accomplished in 2 weeks:                         2. Patient will report performance of HEP at least 2 times per day to facilitate improved outcomes and improved self management. -progressing 8-25-17.                          6. Pt will demonstrate ability to perform posterior pelvic tilt with reversal of lordotic lumbar curvature to reduce LBP and facilitate self management. Fair performance, progressing 8/29/2017  Long Term Goals: To be accomplished in 4 weeks:                         1.  Patient will report FOTO score of 61 or better to indicate significant improvement in functional status. TC Will complete NV 9/26/2017                         2. Pt will demonstrate (-) wilda test bilat to reduce trunk extensor stress on her lumbar spine and improve ease of daily activity. Not met - + bilaterally 9/14/2017. Remains (+) bilat 9/26/2017                         3. Pt will demonstrate trunk flexion fingers to toes to improve trunk mobility and ease of stooping activity. Met 9/14/2017                          4. Pt will report ability to sleep 4 hours during the night undisturbed by pain to improve QOL.  Not met, pt reports continued LBP and sleep disturbance, particularly in supine 9/26/2017    PLAN  [x]  Upgrade activities as tolerated     [x]  Continue plan of care  []  Update interventions per flow sheet       []  Discharge due to:_  []  Other:_      [facility], PT  10/4/2017  6:16 PM    Future Appointments  Date Time Provider Kim Alexandre   10/10/2017 6:00 PM Rowan RODRIGUES

## 2017-10-10 ENCOUNTER — HOSPITAL ENCOUNTER (OUTPATIENT)
Dept: PHYSICAL THERAPY | Age: 47
Discharge: HOME | End: 2017-10-10
Payer: COMMERCIAL

## 2017-10-10 PROCEDURE — 97014 ELECTRIC STIMULATION THERAPY: CPT

## 2017-10-10 PROCEDURE — 97110 THERAPEUTIC EXERCISES: CPT

## 2017-10-10 PROCEDURE — 97112 NEUROMUSCULAR REEDUCATION: CPT

## 2017-10-10 NOTE — PROGRESS NOTES
PT DAILY TREATMENT NOTE 12    Patient Name: Adriano Motley  Date:10/10/2017  : 1970  [x]  Patient  Verified  Payor: BLUE [employer]OSS / Plan: [facility] PPO / Product Type: PPO /    In time:6:04pm  Out time:6:58pm  Total Treatment Time (min): 47   1:1 time: 44  Visit #: 4 of 2-6    Treatment Area: Low back pain [M54.5]    SUBJECTIVE  Pain Level (0-10 scale): 9  Any medication changes, allergies to medications, adverse drug reactions, diagnosis change, or new procedure performed?: [x] No    [] Yes (see summary sheet for update)  Subjective functional status/changes:   [] No changes reported  \"I've been hurting since last week. I don't know if it was because I had 2 days in a row or what. \" Pt reports bilat LBP with some R buttock pain referral upon arrival.    OBJECTIVE  34 min Therapeutic Exercise:  [x] See flow sheet :   Rationale: increase ROM and increase strength to improve the patients ability to improve ADL ease. 10 min Neuromuscular Re-education:  [x]  See flow sheet :   Rationale: increase ROM, increase strength, improve coordination and increase proprioception  to improve the patients ability to improve ease of ADLs     Modality rationale: decrease pain and increase tissue extensibility to improve the patients ability to improve ADL ease.    Min Type Additional Details   10 [x] Estim:  [x]Unatt       [x]IFC  []Premod                        []Other:  []w/ice   [x]w/heat  Position: prone  Location: L/S    [] Estim: []Att    []TENS instruct  []NMES                    []Other:  []w/US   []w/ice   []w/heat  Position:  Location:    []  Traction: [] Cervical       []Lumbar                       [] Prone          []Supine                       []Intermittent   []Continuous Lbs:  [] before manual  [] after manual    []  Ultrasound: []Continuous   [] Pulsed                           []1MHz   []3MHz Location:  W/cm2:    []  Iontophoresis with dexamethasone         Location: [] Take home patch   [] In clinic    []  Ice     []  heat  []  Ice massage  []  Laser   []  Anodyne Position:  Location:    []  Laser with stim  []  Other: Position:  Location:    []  Vasopneumatic Device Pressure:       [] lo [] med [] hi   Temperature: [] lo [] med [] hi   [] Skin assessment post-treatment:  []intact []redness- no adverse reaction    []redness - adverse reaction:           With   [] TE   [] TA   [] neuro   [] other: Patient Education: [x] Review HEP    [] Progressed/Changed HEP based on:   [] positioning   [] body mechanics   [] transfers   [] heat/ice application    [] other:      Other Objective/Functional Measures: FOTO: 33 decline, though pt reports she feels she improved despite her decreased score     Trunk flexion fingers to floor with reversal of lumbar lordotic curvature    Near (-) L wilda test, R remains (+)    Pain Level (0-10 scale) post treatment: 8-9    ASSESSMENT/Changes in Function: Pt demonstrates limited progress to date, with recent flare up in symptoms. She does demonstrate some improved trunk flexion mobility and hip ext mobility, but remains limited most with L hip ext. Will continue with emphasis on hip flexor mobility and trunk extensor mobility progressing towards DC, but will refer back if symptoms remain elevated or worsening. Patient will continue to benefit from skilled PT services to modify and progress therapeutic interventions, address functional mobility deficits, address ROM deficits, address strength deficits, analyze and address soft tissue restrictions, analyze and cue movement patterns, analyze and modify body mechanics/ergonomics and instruct in home and community integration to attain remaining goals. []  See Plan of Care  []  See progress note/recertification  []  See Discharge Summary         Progress towards goals / Updated goals:  Short Term Goals: To be accomplished in 2 weeks:                         4.  Patient will report performance of HEP at least 2 times per day to facilitate improved outcomes and improved self management. -progressing 8-25-17.                          1. Pt will demonstrate ability to perform posterior pelvic tilt with reversal of lordotic lumbar curvature to reduce LBP and facilitate self management. Fair performance, progressing 8/29/2017  Long Term Goals: To be accomplished in 4 weeks:                         1. Patient will report FOTO score of 61 or better to indicate significant improvement in functional status. TC Will complete NV 9/26/2017 33 decline, though pt reports subjectively that she feels she has improved 10/10/2017                         2. Pt will demonstrate (-) wilda test bilat to reduce trunk extensor stress on her lumbar spine and improve ease of daily activity. Not met - + bilaterally 9/14/2017. Remains (+) bilat 9/26/2017 Some progress, near (-) L, (+) R 10/10/2017                         3. Pt will demonstrate trunk flexion fingers to toes to improve trunk mobility and ease of stooping activity. Met 9/14/2017 Met 10/10/2017                         4. Pt will report ability to sleep 4 hours during the night undisturbed by pain to improve QOL. Not met, pt reports continued LBP and sleep disturbance, particularly in supine 9/26/2017 Not met 10/10/2017    PLAN  []  Upgrade activities as tolerated     []  Continue plan of care  []  Update interventions per flow sheet       []  Discharge due to:_  [x]  Other:_ Continue 2 visits, progress to HEP. If symptoms flare up or remain elevated or present with new symptoms, refer back to referring provider for further workup PRN.     Jose C Donald, PT, DPT, ATC, CSCS 10/10/2017  6:31 PM    Future Appointments  Date Time Provider Kim Alexandre   12/1/2017 9:00 PM [address] [facility]

## 2017-10-19 ENCOUNTER — HOSPITAL ENCOUNTER (OUTPATIENT)
Dept: PHYSICAL THERAPY | Age: 47
Discharge: HOME | End: 2017-10-19
Payer: COMMERCIAL

## 2017-10-19 PROCEDURE — 97110 THERAPEUTIC EXERCISES: CPT

## 2017-10-19 NOTE — PROGRESS NOTES
PT DAILY TREATMENT NOTE     Patient Name: Adriano Motley  Date:10/19/2017  : 1970  [x]  Patient  Verified  Payor: BLUE CROSS / Plan: [facility] PPO / Product Type: PPO /    In time:6:00pm  Out time:6:48pm  Total Treatment Time (min): 50   1:1 time: 48  Visit #: 5 of 2-6    Treatment Area: Low back pain [M54.5]    SUBJECTIVE  Pain Level (0-10 scale): 7-8  Any medication changes, allergies to medications, adverse drug reactions, diagnosis change, or new procedure performed?: [x] No    [] Yes (see summary sheet for update)  Subjective functional status/changes:   [] No changes reported  Pt had concerns regarding multiple topics related to recent cervical, lumbar MRI imaging, UE NCS/EMG result indicating neuropthy. Discussed this with pt and addressed her concerns from a PT standpoint and advised that she f/u to discuss these results with her ordering physician for further information regarding cause, relevancy to presentation, etc... OBJECTIVE  48 min Therapeutic Exercise:  [x] See flow sheet : & pt education   Rationale: increase ROM and increase strength to improve the patients ability to improve ease of ADLs. With   [] TE   [] TA   [] neuro   [] other: Patient Education: [x] Review HEP    [] Progressed/Changed HEP based on:   [] positioning   [] body mechanics   [] transfers   [] heat/ice application    [] other:      Other Objective/Functional Measures:     Pain Level (0-10 scale) post treatment: 6    ASSESSMENT/Changes in Function: Pt has demonstrated some improved mobility, but minimal significant change in carryover into pain control. Discussed continuing HEP and gradual progression as tolerated into exercise including aquatics over the next few months.  Advised pt to f/u with further diagnostic assessment including MR imaging with her physician and to f/u PRN in a few months with PT if pain still limiting and new objective information available indicative of a PT treatable pathology. Patient will continue to benefit from skilled PT services to modify and progress therapeutic interventions, address functional mobility deficits, address ROM deficits, address strength deficits, analyze and address soft tissue restrictions, analyze and cue movement patterns, analyze and modify body mechanics/ergonomics and instruct in home and community integration to attain remaining goals. []  See Plan of Care  []  See progress note/recertification  []  See Discharge Summary         Progress towards goals / Updated goals:  Short Term Goals: To be accomplished in 2 weeks:                         9. Patient will report performance of HEP at least 2 times per day to facilitate improved outcomes and improved self management. -progressing 8-25-17.                          6. Pt will demonstrate ability to perform posterior pelvic tilt with reversal of lordotic lumbar curvature to reduce LBP and facilitate self management. Fair performance, progressing 8/29/2017  Long Term Goals: To be accomplished in 4 weeks:                         1. Patient will report FOTO score of 61 or better to indicate significant improvement in functional status. TC Will complete NV 9/26/2017 33 decline, though pt reports subjectively that she feels she has improved 10/10/2017 50 decline 10/19/2017                         2. Pt will demonstrate (-) wilda test bilat to reduce trunk extensor stress on her lumbar spine and improve ease of daily activity. Not met - + bilaterally 9/14/2017. Remains (+) bilat 9/26/2017 Some progress, near (-) L, (+) R 10/10/2017                         3. Pt will demonstrate trunk flexion fingers to toes to improve trunk mobility and ease of stooping activity. Met 9/14/2017 Met 10/10/2017                         4. Pt will report ability to sleep 4 hours during the night undisturbed by pain to improve QOL.  Not met, pt reports continued LBP and sleep disturbance, particularly in supine 9/26/2017 Not met 10/10/2017    PLAN  []  Upgrade activities as tolerated     []  Continue plan of care  []  Update interventions per flow sheet       [x]  Discharge due to:_limited progress  []  Other:_      Erwin Bear, PT, DPT, ATC, CSCS 10/19/2017  7:18 PM    Future Appointments  Date Time Provider Kim Alexandre   12/1/2017 9:00 PM [facility]

## 2018-01-22 NOTE — PROGRESS NOTES
[facility]  [address] [facility] [address]  [place], [address]  [phone] fax    Physical Therapy Discharge Summary  Patient name: Tracee Valle Start of Care: 2017   Referral source: Sukh Melchor MD : 1970   Medical/Treatment Diagnosis: Low back pain [M54.5] Onset Date: 20+ years, exacerbation over last 6-9 months                          Prior Hospitalization: see medical history Provider#: [id number]   Medications: Verified on Patient Summary List     Comorbidities: \"arthritis\", otherwise unremarkable per pt report   Prior Level of Function: limited activity tolerance intermittently for years d/t pain  Visits from Start of Care: 12    Missed Visits: 0  Reporting Period : 2017 to 10/19/2017      Summary of Care:  Short Term Goals: To be accomplished in 2 weeks:                         1. Patient will report performance of HEP at least 2 times per day to facilitate improved outcomes and improved self management. -progressing 17.                          2. Pt will demonstrate ability to perform posterior pelvic tilt with reversal of lordotic lumbar curvature to reduce LBP and facilitate self management. Fair performance, progressing 2017  Long Term Goals: To be accomplished in 4 weeks:                         1. Patient will report FOTO score of 61 or better to indicate significant improvement in functional status. TC Will complete NV 2017 33 decline, though pt reports subjectively that she feels she has improved 10/10/2017 50 decline 10/19/2017                         2. Pt will demonstrate (-) wilda test bilat to reduce trunk extensor stress on her lumbar spine and improve ease of daily activity.  Not met - + bilaterally 2017. Remains (+) bilat 2017 Some progress, near (-) L, (+) R 10/10/2017                         3. Pt will demonstrate trunk flexion fingers to toes to improve trunk mobility and ease of stooping activity. Met 9/14/2017 Met 10/10/2017                         4. Pt will report ability to sleep 4 hours during the night undisturbed by pain to improve QOL. Not met, pt reports continued LBP and sleep disturbance, particularly in supine 9/26/2017 Not met 10/10/2017    ASSESSMENT/RECOMMENDATIONS: Pt has demonstrated some improved mobility, but minimal significant change in carryover into pain control. Discussed continuing HEP and gradual progression as tolerated into exercise including aquatics over the next few months. Advised pt to f/u with further diagnostic assessment including MR imaging with her physician and to f/u PRN in a few months with PT if pain still limiting and new objective information available indicative of a PT treatable pathology.     [x]Discontinue therapy: []Patient has reached or is progressing toward set goals      []Patient is non-compliant or has abdicated      [x]Due to lack of appreciable progress towards set goals    Dipesh Alcaraz, PT, DPT, ATC 1/22/2018 9:31 AM

## 2020-07-23 PROBLEM — N93.9 ABNORMAL UTERINE BLEEDING: Status: ACTIVE | Noted: 2020-07-23

## 2020-07-23 PROBLEM — Z90.710 S/P HYSTERECTOMY: Status: ACTIVE | Noted: 2020-07-23

## 2020-07-23 PROBLEM — D21.9 FIBROID: Status: ACTIVE | Noted: 2020-07-23
